# Patient Record
Sex: MALE | Race: WHITE | NOT HISPANIC OR LATINO | Employment: FULL TIME | URBAN - METROPOLITAN AREA
[De-identification: names, ages, dates, MRNs, and addresses within clinical notes are randomized per-mention and may not be internally consistent; named-entity substitution may affect disease eponyms.]

---

## 2017-04-12 ENCOUNTER — OFFICE VISIT (OUTPATIENT)
Dept: OPHTHALMOLOGY | Facility: CLINIC | Age: 72
End: 2017-04-12
Payer: MEDICARE

## 2017-04-12 DIAGNOSIS — E11.9 TYPE 2 DIABETES MELLITUS WITHOUT COMPLICATION, WITHOUT LONG-TERM CURRENT USE OF INSULIN: Primary | ICD-10-CM

## 2017-04-12 DIAGNOSIS — H04.123 DRY EYE SYNDROME, BILATERAL: ICD-10-CM

## 2017-04-12 DIAGNOSIS — H40.31X2 TRAUMATIC GLAUCOMA, RIGHT, MODERATE STAGE: ICD-10-CM

## 2017-04-12 PROCEDURE — 92083 EXTENDED VISUAL FIELD XM: CPT | Mod: S$GLB,,, | Performed by: OPHTHALMOLOGY

## 2017-04-12 PROCEDURE — 99999 PR PBB SHADOW E&M-EST. PATIENT-LVL I: CPT | Mod: PBBFAC,,, | Performed by: OPHTHALMOLOGY

## 2017-04-12 PROCEDURE — 92133 CPTRZD OPH DX IMG PST SGM ON: CPT | Mod: S$GLB,,, | Performed by: OPHTHALMOLOGY

## 2017-04-12 PROCEDURE — 92014 COMPRE OPH EXAM EST PT 1/>: CPT | Mod: S$GLB,,, | Performed by: OPHTHALMOLOGY

## 2017-04-12 NOTE — PROGRESS NOTES
HPI     3 month IOP check and HVF review.  Eyes tire easily.  His job requires   reading extensively.    Last visit 1/5/15  DM since 2000  Traumatic aphakia OD  Glaucoma OD noncompliant  tmax 37/22  Last VF 4/2011, sup altitudinal loss      Alphagan, latanoprost and cosopt all burned with photosensitivity    Ti molol bid od  Latanoprost qhs OU  95% compliance  O3FO bid  Systane ultra prn       Last edited by Ruma Harrell on 4/12/2017  9:27 AM.         Assessment /Plan     For exam results, see Encounter Report.      ICD-10-CM ICD-9-CM    1. Type 2 diabetes mellitus without complication, without long-term current use of insulin E11.9 250.00 Diabetes with no diabetic retinopathy on dilated exam.   Reviewed diabetic eye precautions including excellent blood sugar control, and importance of regular follow up.          2. Traumatic glaucoma, right, moderate stage H40.31X2 365.65 Lopez Visual Field - OU - Extended - Both Eyes     365.72 Posterior Segment OCT Optic Nerve- Both eyes Done today   Doing well - intraocular pressure is within acceptable range relative to target pressure with no evidence of progression.   Continue current treatment.  Reviewed importance of continued compliance with treatment and follow up.      3. Dry eye syndrome, bilateral H04.123 375.15 Appears stable- Follow        RETURN TO CLINIC 4 month IOP      Ti molol bid od  Latanoprost qhs OU  95% compliance  O3FO bid  Systane ultra prn

## 2017-04-12 NOTE — LETTER
[facility] - Ophthalmology  [address] [address]  Phone: [phone]  Fax: [phone]   April 12, 2017    Brandon Wharton MD  [address] [facility] [address]    Patient: João Singh   MR Number: [id number]   YOB: 1945   Date of Visit: 4/12/2017       Dear Dr. Wharton :    Thank you for referring João Singh to me for evaluation. Here is my assessment and plan of care:     /       For exam results, see Encounter Report.      ICD-10-CM ICD-9-CM    1. Type 2 diabetes mellitus without complication, without long-term current use of insulin E11.9 250.00 Diabetes with no diabetic retinopathy on dilated exam.   Reviewed diabetic eye precautions including excellent blood sugar control, and importance of regular follow up.          2. Traumatic glaucoma, right, moderate stage H40.31X2 365.65 Lopez Visual Field - OU - Extended - Both Eyes     365.72 Posterior Segment OCT Optic Nerve- Both eyes Done today   Doing well - intraocular pressure is within acceptable range relative to target pressure with no evidence of progression.   Continue current treatment.  Reviewed importance of continued compliance with treatment and follow up.      3. Dry eye syndrome, bilateral H04.123 375.15 Appears stable- Follow        RETURN TO CLINIC 4 month IOP      Ti molol bid od  Latanoprost qhs OU  95% compliance  O3FO bid  Systane ultra prn                  If you have questions, please do not hesitate to call me. I look forward to following Mr. João Singh along with you.    Sincerely,        Brandon Spencer MD       CC  No Recipients

## 2017-06-12 DIAGNOSIS — H40.31X0 TRAUMATIC GLAUCOMA, RIGHT, STAGE UNSPECIFIED: ICD-10-CM

## 2017-06-12 RX ORDER — LATANOPROST 50 UG/ML
SOLUTION/ DROPS OPHTHALMIC
Qty: 2.5 ML | Refills: 4 | Status: SHIPPED | OUTPATIENT
Start: 2017-06-12 | End: 2018-04-30 | Stop reason: SDUPTHER

## 2017-06-13 RX ORDER — TIMOLOL MALEATE 5 MG/ML
SOLUTION/ DROPS OPHTHALMIC
Qty: 5 ML | Refills: 5 | Status: SHIPPED | OUTPATIENT
Start: 2017-06-13 | End: 2018-03-06 | Stop reason: SDUPTHER

## 2017-08-30 ENCOUNTER — OFFICE VISIT (OUTPATIENT)
Dept: OPHTHALMOLOGY | Facility: CLINIC | Age: 72
End: 2017-08-30
Payer: MEDICARE

## 2017-08-30 DIAGNOSIS — H04.123 DRY EYE SYNDROME, BILATERAL: ICD-10-CM

## 2017-08-30 DIAGNOSIS — H40.31X2 TRAUMATIC GLAUCOMA, RIGHT, MODERATE STAGE: Primary | ICD-10-CM

## 2017-08-30 PROCEDURE — 92012 INTRM OPH EXAM EST PATIENT: CPT | Mod: S$GLB,,, | Performed by: OPHTHALMOLOGY

## 2017-08-30 PROCEDURE — 99999 PR PBB SHADOW E&M-EST. PATIENT-LVL II: CPT | Mod: PBBFAC,,, | Performed by: OPHTHALMOLOGY

## 2017-08-30 NOTE — PROGRESS NOTES
HPI     4 month IOP check.      Last visit 1/5/15  DM since 2000  Traumatic aphakia OD  Glaucoma OD noncompliant  tmax 37/22  Last VF 4/2011, sup altitudinal loss      Alphagan, latanoprost and cosopt all burned with photosensitivity    Timolol bid od  Latanoprost qhs OU  98% compliance  O3FO bid  Systane ultra prn    Last edited by Ruma Harrell on 8/30/2017  8:30 AM. (History)            Assessment /Plan     For exam results, see Encounter Report.      ICD-10-CM ICD-9-CM    1. Traumatic glaucoma, right, moderate stage H40.31X2 365.65 Doing well - intraocular pressure is within acceptable range relative to target pressure with no evidence of progression.   Continue current treatment.  Reviewed importance of continued compliance with treatment and follow up.        365.72    2. Dry eye syndrome, bilateral H04.123 375.15 Cont current regimen       RETURN TO CLINIC 4 months with IOP check

## 2018-03-06 DIAGNOSIS — H40.31X0 TRAUMATIC GLAUCOMA, RIGHT, STAGE UNSPECIFIED: ICD-10-CM

## 2018-03-06 RX ORDER — TIMOLOL MALEATE 5 MG/ML
SOLUTION/ DROPS OPHTHALMIC
Qty: 5 ML | Refills: 4 | Status: SHIPPED | OUTPATIENT
Start: 2018-03-06 | End: 2018-10-27 | Stop reason: SDUPTHER

## 2018-04-30 DIAGNOSIS — H40.31X0 TRAUMATIC GLAUCOMA, RIGHT, STAGE UNSPECIFIED: ICD-10-CM

## 2018-04-30 RX ORDER — LATANOPROST 50 UG/ML
1 SOLUTION/ DROPS OPHTHALMIC NIGHTLY
Qty: 2.5 ML | Refills: 4 | Status: SHIPPED | OUTPATIENT
Start: 2018-04-30 | End: 2018-05-11 | Stop reason: SDUPTHER

## 2018-05-11 DIAGNOSIS — H40.31X0 TRAUMATIC GLAUCOMA, RIGHT, STAGE UNSPECIFIED: ICD-10-CM

## 2018-05-11 RX ORDER — LATANOPROST 50 UG/ML
1 SOLUTION/ DROPS OPHTHALMIC NIGHTLY
Qty: 2.5 ML | Refills: 11 | Status: SHIPPED | OUTPATIENT
Start: 2018-05-11 | End: 2019-04-15 | Stop reason: SDUPTHER

## 2018-10-23 ENCOUNTER — TELEPHONE (OUTPATIENT)
Dept: OPHTHALMOLOGY | Facility: CLINIC | Age: 73
End: 2018-10-23

## 2018-10-27 DIAGNOSIS — H40.31X0 TRAUMATIC GLAUCOMA, RIGHT, STAGE UNSPECIFIED: ICD-10-CM

## 2018-10-29 RX ORDER — TIMOLOL MALEATE 5 MG/ML
SOLUTION/ DROPS OPHTHALMIC
Qty: 5 ML | Refills: 0 | Status: SHIPPED | OUTPATIENT
Start: 2018-10-29 | End: 2018-12-03 | Stop reason: SDUPTHER

## 2018-11-07 ENCOUNTER — OFFICE VISIT (OUTPATIENT)
Dept: OPHTHALMOLOGY | Facility: CLINIC | Age: 73
End: 2018-11-07
Payer: MEDICARE

## 2018-11-07 DIAGNOSIS — H52.7 REFRACTION DISORDER: ICD-10-CM

## 2018-11-07 DIAGNOSIS — H18.529 MAP-DOT-FINGERPRINT CORNEAL DYSTROPHY: ICD-10-CM

## 2018-11-07 DIAGNOSIS — E11.9 TYPE 2 DIABETES MELLITUS WITHOUT COMPLICATION, WITHOUT LONG-TERM CURRENT USE OF INSULIN: ICD-10-CM

## 2018-11-07 DIAGNOSIS — H25.12 NUCLEAR SENILE CATARACT OF LEFT EYE: ICD-10-CM

## 2018-11-07 DIAGNOSIS — H04.123 DRY EYE SYNDROME, BILATERAL: ICD-10-CM

## 2018-11-07 DIAGNOSIS — H40.31X2 TRAUMATIC GLAUCOMA, RIGHT, MODERATE STAGE: Primary | ICD-10-CM

## 2018-11-07 PROCEDURE — 92014 COMPRE OPH EXAM EST PT 1/>: CPT | Mod: S$GLB,,, | Performed by: OPHTHALMOLOGY

## 2018-11-07 PROCEDURE — 99999 PR PBB SHADOW E&M-EST. PATIENT-LVL II: CPT | Mod: PBBFAC,,, | Performed by: OPHTHALMOLOGY

## 2018-11-07 PROCEDURE — 92015 DETERMINE REFRACTIVE STATE: CPT | Mod: S$GLB,,, | Performed by: OPHTHALMOLOGY

## 2018-11-07 RX ORDER — INSULIN GLARGINE 100 [IU]/ML
INJECTION, SOLUTION SUBCUTANEOUS
COMMUNITY
Start: 2018-10-24 | End: 2019-01-22

## 2018-11-07 NOTE — PROGRESS NOTES
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 The patient   states his eyes are doing okay but he has noticed a decrease in vision.   The patient denies any ocular pain.   100% drop compliance      Last visit 1/5/15  1. DM since 2000  2. Traumatic aphakia OD  3. Glaucoma OD noncompliant  tmax 37/22  Last VF 4/2011, sup altitudinal loss      Alphagan, latanoprost and cosopt all burned with photosensitivity    Timolol bid od  Xalatan qhs OD  O3FO bid  Systane ultra prn          Last edited by Reena Sims on 11/7/2018  2:55 PM. (History)            Assessment /Plan     For exam results, see Encounter Report.      ICD-10-CM ICD-9-CM    1. Traumatic glaucoma, right, moderate stage H40.31X2 365.65 Doing well - intraocular pressure is within acceptable range relative to target pressure with no evidence of progression.   Continue current treatment.  Reviewed importance of continued compliance with treatment and follow up.        365.72    2. Type 2 diabetes mellitus without complication, without long-term current use of insulin E11.9 250.00 Diabetes with no diabetic retinopathy on dilated exam.   Reviewed diabetic eye precautions including excellent blood sugar control, and importance of regular follow up.          3. Map-dot-fingerprint corneal dystrophy H18.59 371.52 Both eyes. Will follow    4. Dry eye syndrome, bilateral H04.123 375.15 Well, continue current treatment.    5. 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 Balance OD    6. Nuclear sclerosis, left eye- will follow     Timolol bid od  Xalatan qhs OD  O3FO bid  Systane ultra prn       Return to clinic 4 months with IOP check and GOCT

## 2018-12-03 DIAGNOSIS — H40.31X0 TRAUMATIC GLAUCOMA, RIGHT, STAGE UNSPECIFIED: ICD-10-CM

## 2018-12-04 RX ORDER — TIMOLOL MALEATE 5 MG/ML
SOLUTION/ DROPS OPHTHALMIC
Qty: 5 ML | Refills: 5 | Status: SHIPPED | OUTPATIENT
Start: 2018-12-04 | End: 2019-07-09 | Stop reason: SDUPTHER

## 2018-12-06 ENCOUNTER — TELEPHONE (OUTPATIENT)
Dept: OPHTHALMOLOGY | Facility: CLINIC | Age: 73
End: 2018-12-06

## 2018-12-06 NOTE — TELEPHONE ENCOUNTER
----- Message from Sugey Sales sent at 12/6/2018  2:10 PM CST -----  Contact: eleonora Harp needs call back to get patient ad power on patient rx [phone]

## 2019-04-15 DIAGNOSIS — H40.31X0 TRAUMATIC GLAUCOMA, RIGHT, STAGE UNSPECIFIED: ICD-10-CM

## 2019-04-15 RX ORDER — LATANOPROST 50 UG/ML
SOLUTION/ DROPS OPHTHALMIC
Qty: 2.5 ML | Refills: 10 | Status: SHIPPED | OUTPATIENT
Start: 2019-04-15 | End: 2019-10-12 | Stop reason: SDUPTHER

## 2019-04-19 NOTE — TELEPHONE ENCOUNTER
----- Message from Mi Muellerite sent at 10/23/2018  3:40 PM CDT -----  Contact: Pt  Pt called and stated he needs to schedule with Dr. Spencer. He can be reached at [phone].    Thanks,  TF     
I left a message for the patient to call me. I was calling the patient to see if I could schedule him an appointment to see Dr. Spencer.  
Ambulatory

## 2019-07-09 DIAGNOSIS — H40.31X0 TRAUMATIC GLAUCOMA, RIGHT, STAGE UNSPECIFIED: ICD-10-CM

## 2019-07-09 RX ORDER — TIMOLOL MALEATE 5 MG/ML
SOLUTION/ DROPS OPHTHALMIC
Qty: 5 ML | Refills: 4 | Status: SHIPPED | OUTPATIENT
Start: 2019-07-09 | End: 2019-12-24

## 2019-10-12 DIAGNOSIS — H40.31X0 TRAUMATIC GLAUCOMA, RIGHT, STAGE UNSPECIFIED: ICD-10-CM

## 2019-10-14 RX ORDER — LATANOPROST 50 UG/ML
SOLUTION/ DROPS OPHTHALMIC
Qty: 2.5 ML | Refills: 9 | Status: SHIPPED | OUTPATIENT
Start: 2019-10-14 | End: 2020-07-10 | Stop reason: SDUPTHER

## 2019-10-29 ENCOUNTER — OFFICE VISIT (OUTPATIENT)
Dept: OPHTHALMOLOGY | Facility: CLINIC | Age: 74
End: 2019-10-29
Payer: MEDICARE

## 2019-10-29 DIAGNOSIS — H40.31X2 TRAUMATIC GLAUCOMA, RIGHT, MODERATE STAGE: Primary | ICD-10-CM

## 2019-10-29 DIAGNOSIS — H18.529 MAP-DOT-FINGERPRINT CORNEAL DYSTROPHY: ICD-10-CM

## 2019-10-29 DIAGNOSIS — H25.12 NUCLEAR SENILE CATARACT OF LEFT EYE: ICD-10-CM

## 2019-10-29 DIAGNOSIS — E11.9 TYPE 2 DIABETES MELLITUS WITHOUT COMPLICATION, WITHOUT LONG-TERM CURRENT USE OF INSULIN: ICD-10-CM

## 2019-10-29 DIAGNOSIS — H04.123 DRY EYE SYNDROME, BILATERAL: ICD-10-CM

## 2019-10-29 PROCEDURE — 99999 PR PBB SHADOW E&M-EST. PATIENT-LVL II: CPT | Mod: PBBFAC,,, | Performed by: OPHTHALMOLOGY

## 2019-10-29 PROCEDURE — 92133 POSTERIOR SEGMENT OCT OPTIC NERVE(OCULAR COHERENCE TOMOGRAPHY) - OU - BOTH EYES: ICD-10-PCS | Mod: S$GLB,,, | Performed by: OPHTHALMOLOGY

## 2019-10-29 PROCEDURE — 99999 PR PBB SHADOW E&M-EST. PATIENT-LVL II: ICD-10-PCS | Mod: PBBFAC,,, | Performed by: OPHTHALMOLOGY

## 2019-10-29 PROCEDURE — 92012 INTRM OPH EXAM EST PATIENT: CPT | Mod: S$GLB,,, | Performed by: OPHTHALMOLOGY

## 2019-10-29 PROCEDURE — 92133 CPTRZD OPH DX IMG PST SGM ON: CPT | Mod: S$GLB,,, | Performed by: OPHTHALMOLOGY

## 2019-10-29 PROCEDURE — 92012 PR EYE EXAM, EST PATIENT,INTERMED: ICD-10-PCS | Mod: S$GLB,,, | Performed by: OPHTHALMOLOGY

## 2019-10-29 RX ORDER — BLOOD SUGAR DIAGNOSTIC
STRIP MISCELLANEOUS
COMMUNITY
Start: 2019-09-16

## 2019-10-29 RX ORDER — PEN NEEDLE, DIABETIC 30 GX3/16"
NEEDLE, DISPOSABLE MISCELLANEOUS
COMMUNITY
Start: 2019-04-23

## 2019-10-29 RX ORDER — LANCETS
EACH MISCELLANEOUS
COMMUNITY
Start: 2019-07-30

## 2019-10-29 RX ORDER — INSULIN GLARGINE 100 [IU]/ML
INJECTION, SOLUTION SUBCUTANEOUS
COMMUNITY
Start: 2019-09-16

## 2019-10-29 RX ORDER — LANCETS
EACH MISCELLANEOUS
COMMUNITY
Start: 2019-10-29

## 2019-10-29 NOTE — PROGRESS NOTES
HPI     Glaucoma      Additional comments: IOP Check with GOCT              Comments     Lost to follow up  Patient is here for IOP check and GOCT, states he is using both drops as   directed.      1. DM since 2000  2. Traumatic aphakia OD  3. Glaucoma OD noncompliant  tmax 37/22  Last VF 4/2011, sup altitudinal loss      Alphagan, latanoprost and cosopt all burned with photosensitivity    Between March 2019 to October 2019 patient has had steroid injections in   his back and knee  DX with Parkinson's in 2016    Timolol bid od(using bid ou)  Xalatan qhs OD(using bid ou)  O3FO bid  Systane ultra prn          Last edited by Venita Mejia, Patient Care Assistant on 10/29/2019  3:45   PM. (History)            Assessment /Plan     For exam results, see Encounter Report.      ICD-10-CM ICD-9-CM    1. Traumatic glaucoma, right, moderate stage H40.31X2 365.65 Posterior Segment OCT Optic Nerve- Both eyes    IOP higher today OD, but thick CCT. Will follow      365.72    2. Type 2 diabetes mellitus without complication, without long-term current use of insulin E11.9 250.00 Dilate next visit    3. Map-dot-fingerprint corneal dystrophy H18.59 371.52 Stable, follow    4. Dry eye syndrome, bilateral H04.123 375.15 Well    5. Nuclear senile cataract of left eye H25.12 366.16   You were found to have an early cataract in your eye(s) today, however the cataract is not affecting your activities of daily living, such as reading and driving.You do not need  surgery at this time. We will recheck your cataract at your next visit. You are welcome to call for an earlier appointment if your vision gets worse.        Timolol bid OU  Xalatan qhs OU  O3FO bid  Systane ultra prn   Return to clinic 4 months with dilation and SDP

## 2019-12-21 DIAGNOSIS — H40.31X0 TRAUMATIC GLAUCOMA, RIGHT, STAGE UNSPECIFIED: ICD-10-CM

## 2019-12-24 RX ORDER — TIMOLOL MALEATE 5 MG/ML
SOLUTION/ DROPS OPHTHALMIC
Qty: 5 ML | Refills: 0 | Status: SHIPPED | OUTPATIENT
Start: 2019-12-24 | End: 2020-01-28

## 2020-01-28 DIAGNOSIS — H40.31X0 TRAUMATIC GLAUCOMA, RIGHT, STAGE UNSPECIFIED: ICD-10-CM

## 2020-01-28 RX ORDER — TIMOLOL MALEATE 5 MG/ML
SOLUTION/ DROPS OPHTHALMIC
Qty: 5 ML | Refills: 12 | Status: SHIPPED | OUTPATIENT
Start: 2020-01-28 | End: 2020-07-10 | Stop reason: SDUPTHER

## 2020-02-13 ENCOUNTER — PATIENT MESSAGE (OUTPATIENT)
Dept: OPHTHALMOLOGY | Facility: CLINIC | Age: 75
End: 2020-02-13

## 2020-02-20 ENCOUNTER — TELEPHONE (OUTPATIENT)
Dept: OPHTHALMOLOGY | Facility: CLINIC | Age: 75
End: 2020-02-20

## 2020-02-20 NOTE — TELEPHONE ENCOUNTER
attempted to call pt. his 3/13 @ 2:15 appt. w/ SANDRA needs to be R/S to another day due to [employer] meeting. Please R/S if the pt. calls back

## 2020-07-10 DIAGNOSIS — H40.31X0 TRAUMATIC GLAUCOMA, RIGHT, STAGE UNSPECIFIED: ICD-10-CM

## 2020-07-10 RX ORDER — LATANOPROST 50 UG/ML
1 SOLUTION/ DROPS OPHTHALMIC NIGHTLY
Qty: 2.5 ML | Refills: 12 | Status: SHIPPED | OUTPATIENT
Start: 2020-07-10 | End: 2020-09-10 | Stop reason: SDUPTHER

## 2020-07-10 RX ORDER — TIMOLOL MALEATE 5 MG/ML
1 SOLUTION/ DROPS OPHTHALMIC 2 TIMES DAILY
Qty: 5 ML | Refills: 3 | Status: SHIPPED | OUTPATIENT
Start: 2020-07-10 | End: 2020-07-30 | Stop reason: SDUPTHER

## 2020-07-30 ENCOUNTER — OFFICE VISIT (OUTPATIENT)
Dept: OPHTHALMOLOGY | Facility: CLINIC | Age: 75
End: 2020-07-30
Payer: MEDICARE

## 2020-07-30 DIAGNOSIS — H04.123 DRY EYE SYNDROME, BILATERAL: Primary | ICD-10-CM

## 2020-07-30 DIAGNOSIS — H40.31X2 TRAUMATIC GLAUCOMA, RIGHT, MODERATE STAGE: ICD-10-CM

## 2020-07-30 DIAGNOSIS — H25.12 NUCLEAR SENILE CATARACT OF LEFT EYE: ICD-10-CM

## 2020-07-30 DIAGNOSIS — E11.9 TYPE 2 DIABETES MELLITUS WITHOUT COMPLICATION, WITHOUT LONG-TERM CURRENT USE OF INSULIN: ICD-10-CM

## 2020-07-30 DIAGNOSIS — H18.529 MAP-DOT-FINGERPRINT CORNEAL DYSTROPHY: ICD-10-CM

## 2020-07-30 DIAGNOSIS — H40.31X0 TRAUMATIC GLAUCOMA, RIGHT, STAGE UNSPECIFIED: ICD-10-CM

## 2020-07-30 PROCEDURE — 92014 PR EYE EXAM, EST PATIENT,COMPREHESV: ICD-10-PCS | Mod: S$GLB,,, | Performed by: OPHTHALMOLOGY

## 2020-07-30 PROCEDURE — 99999 PR PBB SHADOW E&M-EST. PATIENT-LVL III: ICD-10-PCS | Mod: PBBFAC,,, | Performed by: OPHTHALMOLOGY

## 2020-07-30 PROCEDURE — 99999 PR PBB SHADOW E&M-EST. PATIENT-LVL III: CPT | Mod: PBBFAC,,, | Performed by: OPHTHALMOLOGY

## 2020-07-30 PROCEDURE — 92014 COMPRE OPH EXAM EST PT 1/>: CPT | Mod: S$GLB,,, | Performed by: OPHTHALMOLOGY

## 2020-07-30 RX ORDER — TIMOLOL MALEATE 5 MG/ML
1 SOLUTION/ DROPS OPHTHALMIC 2 TIMES DAILY
Qty: 5 ML | Refills: 3 | Status: SHIPPED | OUTPATIENT
Start: 2020-07-30 | End: 2020-09-10 | Stop reason: SDUPTHER

## 2020-07-30 RX ORDER — LATANOPROST 50 UG/ML
1 SOLUTION/ DROPS OPHTHALMIC DAILY
Qty: 2.5 ML | Refills: 1 | Status: SHIPPED | OUTPATIENT
Start: 2020-07-30 | End: 2021-03-23 | Stop reason: SDUPTHER

## 2020-07-30 NOTE — PROGRESS NOTES
HPI     Glaucoma     Comments: lost to F/U COAG              Comments     The patient states his eyes are doing fine and he denies any ocular pain   at this time.    1. DM since 2000  2. Traumatic aphakia OD  3. Glaucoma OD noncompliant  tmax 37/22  Last VF 4/2011, sup altitudinal loss  4. Parkinsons      Alphagan, latanoprost and cosopt all burned with photosensitivity    Between March 2019 to October 2019 patient has had steroid injections in   his back and knee.  DX with Parkinson's in 2016    Timolol bid od  Xalatan qhs OD  O3FO bid  Systane ultra prn          Last edited by Reena Sims on 7/30/2020  3:08 PM. (History)            Assessment /Plan     For exam results, see Encounter Report.      ICD-10-CM ICD-9-CM    1. Dry eye syndrome, bilateral  H04.123 375.15 Dryness increased on exam today  Will add Systane Gel QPM     2. Traumatic glaucoma, right, moderate stage  H40.31X2 365.65 IOP not within acceptable range relative to target IOP with risk of irreversible visual loss. Additional treatment required.  Discussed options, risks, and benefits of additional medication, SLT laser, or incisional glaucoma surgery.     recommend observing and consider SLT OD in 1 month    Patient chooses above    Reviewed importance of continued compliance with treatment and follow up.        365.72    3. Type 2 diabetes mellitus without complication, without long-term current use of insulin  E11.9 250.00 Diabetes with no diabetic retinopathy on dilated exam.   Reviewed diabetic eye precautions including excellent blood sugar control, and importance of regular follow up.          4. Map-dot-fingerprint corneal dystrophy  H18.59 371.52 FOLLOW   5. Nuclear senile cataract of left eye  H25.12 366.16 FOLLOW     Systane Gel QPM OU      Return to clinic 1M IOP check and consider SLT OD

## 2020-09-10 DIAGNOSIS — H40.31X2 TRAUMATIC GLAUCOMA, RIGHT, MODERATE STAGE: ICD-10-CM

## 2020-09-10 DIAGNOSIS — H40.31X0 TRAUMATIC GLAUCOMA, RIGHT, STAGE UNSPECIFIED: ICD-10-CM

## 2020-09-10 RX ORDER — LATANOPROST 50 UG/ML
1 SOLUTION/ DROPS OPHTHALMIC NIGHTLY
Qty: 2.5 ML | Refills: 6 | Status: SHIPPED | OUTPATIENT
Start: 2020-09-10 | End: 2021-05-12 | Stop reason: SDUPTHER

## 2020-09-10 RX ORDER — TIMOLOL MALEATE 5 MG/ML
1 SOLUTION/ DROPS OPHTHALMIC 2 TIMES DAILY
Qty: 5 ML | Refills: 6 | Status: SHIPPED | OUTPATIENT
Start: 2020-09-10 | End: 2021-03-23 | Stop reason: SDUPTHER

## 2020-09-15 ENCOUNTER — TELEPHONE (OUTPATIENT)
Dept: OPHTHALMOLOGY | Facility: CLINIC | Age: 75
End: 2020-09-15

## 2020-09-15 NOTE — TELEPHONE ENCOUNTER
Spoke with patient and he will try and keep his appointment tomorrow with Dr. Spencer but is no sure if he can.

## 2020-10-27 ENCOUNTER — OFFICE VISIT (OUTPATIENT)
Dept: OPHTHALMOLOGY | Facility: CLINIC | Age: 75
End: 2020-10-27
Payer: MEDICARE

## 2020-10-27 DIAGNOSIS — H40.31X2 TRAUMATIC GLAUCOMA, RIGHT, MODERATE STAGE: Primary | ICD-10-CM

## 2020-10-27 DIAGNOSIS — H04.123 DRY EYE SYNDROME, BILATERAL: ICD-10-CM

## 2020-10-27 DIAGNOSIS — H18.529 MAP-DOT-FINGERPRINT CORNEAL DYSTROPHY: ICD-10-CM

## 2020-10-27 PROCEDURE — 99024 PR POST-OP FOLLOW-UP VISIT: ICD-10-PCS | Mod: S$GLB,,, | Performed by: OPHTHALMOLOGY

## 2020-10-27 PROCEDURE — 99999 PR PBB SHADOW E&M-EST. PATIENT-LVL III: ICD-10-PCS | Mod: PBBFAC,,, | Performed by: OPHTHALMOLOGY

## 2020-10-27 PROCEDURE — 99999 PR PBB SHADOW E&M-EST. PATIENT-LVL III: CPT | Mod: PBBFAC,,, | Performed by: OPHTHALMOLOGY

## 2020-10-27 PROCEDURE — 99024 POSTOP FOLLOW-UP VISIT: CPT | Mod: S$GLB,,, | Performed by: OPHTHALMOLOGY

## 2020-10-27 NOTE — PROGRESS NOTES
HPI     Glaucoma     Comments: 1 mo IOP check              Comments     Pt in today for IOP check. No complaints of pain or discomfort. Pt   compliant with drops.  Pt states he feels his VA is weaker.  1. DM since 2000  2. Traumatic aphakia OD  3. Glaucoma OD noncompliant  tmax 37/22  Last VF 4/2011, sup altitudinal loss  4. Parkinsons      Alphagan, latanoprost and cosopt all burned with photosensitivity    Between March 2019 to October 2019 patient has had steroid injections in   his back and knee.  DX with Parkinson's in 2016    Timolol bid od  Xalatan qhs OU  O3FO bid  Systane ultra prn  Systane gel qpm OU            Last edited by Ariella Baker MA on 10/27/2020  1:24 PM. (History)            Assessment /Plan     For exam results, see Encounter Report.      ICD-10-CM ICD-9-CM    1. Traumatic glaucoma, right, moderate stage  H40.31X2 365.65 Doing well - intraocular pressure is within acceptable range relative to target pressure with no evidence of progression.   Continue current treatment.  Reviewed importance of continued compliance with treatment and follow      365.72    2. Dry eye syndrome, bilateral  H04.123 375.15 Follow    3. Map-dot-fingerprint corneal dystrophy  H18.599 371.52        RETURN TO CLINIC 4 month IOP     Timolol bid od  Xalatan qhs OU  O3FO bid  Systane ultra prn  Systane gel qpm OU

## 2021-03-19 ENCOUNTER — PATIENT MESSAGE (OUTPATIENT)
Dept: OPHTHALMOLOGY | Facility: CLINIC | Age: 76
End: 2021-03-19

## 2021-03-23 DIAGNOSIS — H40.31X0 TRAUMATIC GLAUCOMA, RIGHT, STAGE UNSPECIFIED: ICD-10-CM

## 2021-03-23 DIAGNOSIS — H40.31X2 TRAUMATIC GLAUCOMA, RIGHT, MODERATE STAGE: ICD-10-CM

## 2021-03-23 RX ORDER — LATANOPROST 50 UG/ML
1 SOLUTION/ DROPS OPHTHALMIC DAILY
Qty: 2.5 ML | Refills: 1 | Status: SHIPPED | OUTPATIENT
Start: 2021-03-23 | End: 2021-10-18 | Stop reason: SDUPTHER

## 2021-03-23 RX ORDER — TIMOLOL MALEATE 5 MG/ML
1 SOLUTION/ DROPS OPHTHALMIC 2 TIMES DAILY
Qty: 5 ML | Refills: 6 | Status: SHIPPED | OUTPATIENT
Start: 2021-03-23 | End: 2021-07-25 | Stop reason: SDUPTHER

## 2021-05-06 ENCOUNTER — PATIENT MESSAGE (OUTPATIENT)
Dept: OPHTHALMOLOGY | Facility: CLINIC | Age: 76
End: 2021-05-06

## 2021-05-07 ENCOUNTER — PATIENT MESSAGE (OUTPATIENT)
Dept: OPHTHALMOLOGY | Facility: CLINIC | Age: 76
End: 2021-05-07

## 2021-05-10 ENCOUNTER — PATIENT MESSAGE (OUTPATIENT)
Dept: OPHTHALMOLOGY | Facility: CLINIC | Age: 76
End: 2021-05-10

## 2021-05-12 ENCOUNTER — TELEPHONE (OUTPATIENT)
Dept: OPHTHALMOLOGY | Facility: CLINIC | Age: 76
End: 2021-05-12

## 2021-05-12 DIAGNOSIS — H40.31X0 TRAUMATIC GLAUCOMA, RIGHT, STAGE UNSPECIFIED: ICD-10-CM

## 2021-05-12 RX ORDER — LATANOPROST 50 UG/ML
1 SOLUTION/ DROPS OPHTHALMIC NIGHTLY
Qty: 2.5 ML | Refills: 6 | Status: SHIPPED | OUTPATIENT
Start: 2021-05-12 | End: 2021-07-25 | Stop reason: SDUPTHER

## 2021-07-25 DIAGNOSIS — H40.31X0 TRAUMATIC GLAUCOMA, RIGHT, STAGE UNSPECIFIED: ICD-10-CM

## 2021-07-26 RX ORDER — LATANOPROST 50 UG/ML
1 SOLUTION/ DROPS OPHTHALMIC NIGHTLY
Qty: 2.5 ML | Refills: 6 | Status: SHIPPED | OUTPATIENT
Start: 2021-07-26 | End: 2022-08-15 | Stop reason: SDUPTHER

## 2021-07-27 ENCOUNTER — TELEPHONE (OUTPATIENT)
Dept: OPHTHALMOLOGY | Facility: CLINIC | Age: 76
End: 2021-07-27

## 2021-08-24 ENCOUNTER — PATIENT MESSAGE (OUTPATIENT)
Dept: OPHTHALMOLOGY | Facility: CLINIC | Age: 76
End: 2021-08-24

## 2021-10-18 ENCOUNTER — PATIENT MESSAGE (OUTPATIENT)
Dept: OPHTHALMOLOGY | Facility: CLINIC | Age: 76
End: 2021-10-18
Payer: MEDICARE

## 2021-10-18 DIAGNOSIS — H40.31X2 TRAUMATIC GLAUCOMA, RIGHT, MODERATE STAGE: ICD-10-CM

## 2021-10-19 RX ORDER — TIMOLOL MALEATE 5 MG/ML
1 SOLUTION/ DROPS OPHTHALMIC 2 TIMES DAILY
Qty: 5 ML | Refills: 6 | Status: SHIPPED | OUTPATIENT
Start: 2021-10-19 | End: 2024-01-31 | Stop reason: SDUPTHER

## 2021-10-19 RX ORDER — LATANOPROST 50 UG/ML
1 SOLUTION/ DROPS OPHTHALMIC DAILY
Qty: 2.5 ML | Refills: 6 | Status: SHIPPED | OUTPATIENT
Start: 2021-10-19 | End: 2022-10-19

## 2021-12-01 ENCOUNTER — PATIENT MESSAGE (OUTPATIENT)
Dept: OPHTHALMOLOGY | Facility: CLINIC | Age: 76
End: 2021-12-01
Payer: MEDICARE

## 2022-02-15 ENCOUNTER — OFFICE VISIT (OUTPATIENT)
Dept: OPHTHALMOLOGY | Facility: CLINIC | Age: 77
End: 2022-02-15
Payer: MEDICARE

## 2022-02-15 DIAGNOSIS — H18.523 CORNEAL EPITHELIAL BASEMENT MEMBRANE DYSTROPHY OF BOTH EYES: ICD-10-CM

## 2022-02-15 DIAGNOSIS — E11.9 TYPE 2 DIABETES MELLITUS WITHOUT COMPLICATION, WITHOUT LONG-TERM CURRENT USE OF INSULIN: ICD-10-CM

## 2022-02-15 DIAGNOSIS — H04.123 DRY EYE SYNDROME, BILATERAL: ICD-10-CM

## 2022-02-15 DIAGNOSIS — H40.31X2 TRAUMATIC GLAUCOMA, RIGHT, MODERATE STAGE: Primary | ICD-10-CM

## 2022-02-15 PROCEDURE — 1159F PR MEDICATION LIST DOCUMENTED IN MEDICAL RECORD: ICD-10-PCS | Mod: CPTII,S$GLB,, | Performed by: OPHTHALMOLOGY

## 2022-02-15 PROCEDURE — 99214 OFFICE O/P EST MOD 30 MIN: CPT | Mod: S$GLB,,, | Performed by: OPHTHALMOLOGY

## 2022-02-15 PROCEDURE — 99214 PR OFFICE/OUTPT VISIT, EST, LEVL IV, 30-39 MIN: ICD-10-PCS | Mod: S$GLB,,, | Performed by: OPHTHALMOLOGY

## 2022-02-15 PROCEDURE — 92083 EXTENDED VISUAL FIELD XM: CPT | Mod: S$GLB,,, | Performed by: OPHTHALMOLOGY

## 2022-02-15 PROCEDURE — 1159F MED LIST DOCD IN RCRD: CPT | Mod: CPTII,S$GLB,, | Performed by: OPHTHALMOLOGY

## 2022-02-15 PROCEDURE — 99999 PR PBB SHADOW E&M-EST. PATIENT-LVL IV: ICD-10-PCS | Mod: PBBFAC,,, | Performed by: OPHTHALMOLOGY

## 2022-02-15 PROCEDURE — 1160F RVW MEDS BY RX/DR IN RCRD: CPT | Mod: CPTII,S$GLB,, | Performed by: OPHTHALMOLOGY

## 2022-02-15 PROCEDURE — 92133 CPTRZD OPH DX IMG PST SGM ON: CPT | Mod: S$GLB,,, | Performed by: OPHTHALMOLOGY

## 2022-02-15 PROCEDURE — 99999 PR PBB SHADOW E&M-EST. PATIENT-LVL IV: CPT | Mod: PBBFAC,,, | Performed by: OPHTHALMOLOGY

## 2022-02-15 PROCEDURE — 4010F ACE/ARB THERAPY RXD/TAKEN: CPT | Mod: CPTII,S$GLB,, | Performed by: OPHTHALMOLOGY

## 2022-02-15 PROCEDURE — 92133 POSTERIOR SEGMENT OCT OPTIC NERVE(OCULAR COHERENCE TOMOGRAPHY) - OU - BOTH EYES: ICD-10-PCS | Mod: S$GLB,,, | Performed by: OPHTHALMOLOGY

## 2022-02-15 PROCEDURE — 4010F PR ACE/ARB THEARPY RXD/TAKEN: ICD-10-PCS | Mod: CPTII,S$GLB,, | Performed by: OPHTHALMOLOGY

## 2022-02-15 PROCEDURE — 92083 HUMPHREY VISUAL FIELD - OU - BOTH EYES: ICD-10-PCS | Mod: S$GLB,,, | Performed by: OPHTHALMOLOGY

## 2022-02-15 PROCEDURE — 1160F PR REVIEW ALL MEDS BY PRESCRIBER/CLIN PHARMACIST DOCUMENTED: ICD-10-PCS | Mod: CPTII,S$GLB,, | Performed by: OPHTHALMOLOGY

## 2022-02-15 RX ORDER — DORZOLAMIDE HYDROCHLORIDE AND TIMOLOL MALEATE 20; 5 MG/ML; MG/ML
1 SOLUTION/ DROPS OPHTHALMIC 2 TIMES DAILY
Qty: 10 ML | Refills: 6 | Status: SHIPPED | OUTPATIENT
Start: 2022-02-15 | End: 2023-02-15

## 2022-02-15 RX ORDER — MONTELUKAST SODIUM 10 MG/1
1 TABLET ORAL DAILY
COMMUNITY
Start: 2022-01-31

## 2022-02-15 NOTE — PROGRESS NOTES
HPI     Glaucoma     Comments: 4M iop              Comments     Patient states that OU are a little light sensitive since last visit -   seem to make OU a little more tired by end of the day - Using and   tolerating Stoney OD/ Xalatan OU  - using art tears OU BID - denies pain/   discomfort oU        1. DM since 2000  2. Traumatic aphakia OD  3. Glaucoma OD noncompliant  tmax 37/22  Last VF 4/2011, sup altitudinal loss  4. Parkinsons      Alphagan, latanoprost and cosopt all burned with photosensitivity    Between March 2019 to October 2019 patient has had steroid injections in   his back and knee.  DX with Parkinson's in 2016    Timolol bid od  Xalatan qhs OU  O3FO bid  Systane ultra prn  Systane gel qpm OU            Last edited by Debby Cobb MA on 2/15/2022  4:00 PM. (History)            Assessment /Plan     For exam results, see Encounter Report.      ICD-10-CM ICD-9-CM    1. Traumatic glaucoma, right, moderate stage  H40.31X2 365.65 Lopez Visual Field - OU - Extended - Both Eyes     365.72 Posterior Segment OCT Optic Nerve- Both eyes    IOP OD not within acceptable range relative to target IOP with risk of irreversible visual loss. Additional treatment required.  Discussed options, risks, and benefits of additional medication, SLT laser, or incisional glaucoma surgery.     Recommend Cosopt BID OD, could consider SLT if IOP not lower     Patient chooses the above.    Reviewed importance of continued compliance with treatment and follow up.    2. 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 Type 2 diabetes mellitus without complication, without long-term current use of insulin  E11.9 250.00 No dilation today, though stable at previous dilation. Monitor.   4. Dry eye syndrome, bilateral  H04.123 375.15 Findings and symptoms consistent with moderate dry eyes.       Recommend:  Omega 3 fish oils of [employer] Ultimate Omega 3-4 capsules per  day  Systane Ultra or Refresh Optive Pepito 3: 4 times per day.  Genteal Gel or Systane gel in a tube at bedtime       Return to clinic 1 month for dilation  D/c Timolol, begin Cosopt BID OD        Cosopt BID OD  Xalatan qhs OU  O3FO bid  Systane ultra prn  Systane gel qpm OU

## 2022-03-15 ENCOUNTER — OFFICE VISIT (OUTPATIENT)
Dept: OPHTHALMOLOGY | Facility: CLINIC | Age: 77
End: 2022-03-15
Payer: MEDICARE

## 2022-03-15 DIAGNOSIS — H40.31X2 TRAUMATIC GLAUCOMA, RIGHT, MODERATE STAGE: Primary | ICD-10-CM

## 2022-03-15 DIAGNOSIS — H18.523 CORNEAL EPITHELIAL BASEMENT MEMBRANE DYSTROPHY OF BOTH EYES: ICD-10-CM

## 2022-03-15 DIAGNOSIS — H25.12 NUCLEAR SENILE CATARACT OF LEFT EYE: ICD-10-CM

## 2022-03-15 DIAGNOSIS — H04.123 DRY EYE SYNDROME, BILATERAL: ICD-10-CM

## 2022-03-15 DIAGNOSIS — E11.9 TYPE 2 DIABETES MELLITUS WITHOUT COMPLICATION, WITHOUT LONG-TERM CURRENT USE OF INSULIN: ICD-10-CM

## 2022-03-15 PROCEDURE — 1159F PR MEDICATION LIST DOCUMENTED IN MEDICAL RECORD: ICD-10-PCS | Mod: CPTII,S$GLB,, | Performed by: OPHTHALMOLOGY

## 2022-03-15 PROCEDURE — 1160F PR REVIEW ALL MEDS BY PRESCRIBER/CLIN PHARMACIST DOCUMENTED: ICD-10-PCS | Mod: CPTII,S$GLB,, | Performed by: OPHTHALMOLOGY

## 2022-03-15 PROCEDURE — 99214 OFFICE O/P EST MOD 30 MIN: CPT | Mod: S$GLB,,, | Performed by: OPHTHALMOLOGY

## 2022-03-15 PROCEDURE — 1159F MED LIST DOCD IN RCRD: CPT | Mod: CPTII,S$GLB,, | Performed by: OPHTHALMOLOGY

## 2022-03-15 PROCEDURE — 4010F PR ACE/ARB THEARPY RXD/TAKEN: ICD-10-PCS | Mod: CPTII,S$GLB,, | Performed by: OPHTHALMOLOGY

## 2022-03-15 PROCEDURE — 99999 PR PBB SHADOW E&M-EST. PATIENT-LVL II: ICD-10-PCS | Mod: PBBFAC,,, | Performed by: OPHTHALMOLOGY

## 2022-03-15 PROCEDURE — 1160F RVW MEDS BY RX/DR IN RCRD: CPT | Mod: CPTII,S$GLB,, | Performed by: OPHTHALMOLOGY

## 2022-03-15 PROCEDURE — 4010F ACE/ARB THERAPY RXD/TAKEN: CPT | Mod: CPTII,S$GLB,, | Performed by: OPHTHALMOLOGY

## 2022-03-15 PROCEDURE — 99214 PR OFFICE/OUTPT VISIT, EST, LEVL IV, 30-39 MIN: ICD-10-PCS | Mod: S$GLB,,, | Performed by: OPHTHALMOLOGY

## 2022-03-15 PROCEDURE — 99999 PR PBB SHADOW E&M-EST. PATIENT-LVL II: CPT | Mod: PBBFAC,,, | Performed by: OPHTHALMOLOGY

## 2022-03-15 RX ORDER — GLIPIZIDE 5 MG/1
1 TABLET ORAL DAILY
COMMUNITY
Start: 2022-02-23

## 2022-03-15 RX ORDER — ACETAMINOPHEN AND CODEINE PHOSPHATE 300; 30 MG/1; MG/1
.5-1 TABLET ORAL 2 TIMES DAILY PRN
COMMUNITY
Start: 2021-11-19

## 2022-03-15 NOTE — PROGRESS NOTES
HPI     Glaucoma     Comments: 1M Dilation              Comments     Patient states that he is using and tolerating Cosopt and Xalatan OU as   directed - denies pain/ discomfort OU - denies va changes OU      1. DM since 2000  2. Traumatic aphakia OD  3. Glaucoma OD noncompliant  tmax 37/22  Last VF 4/2011, sup altitudinal loss  4. Parkinsons      Alphagan, latanoprost and cosopt all burned with photosensitivity    Between March 2019 to October 2019 patient has had steroid injections in   his back and knee.  DX with Parkinson's in 2016    Timolol bid od  Xalatan qhs OU  O3FO bid  Systane ultra prn  Systane gel qpm OU            Last edited by Debby Cobb MA on 3/15/2022  3:23 PM. (History)            Assessment /Plan     For exam results, see Encounter Report.      ICD-10-CM ICD-9-CM    1. Traumatic glaucoma, right, moderate stage  H40.31X2 365.65 Doing well - intraocular pressure is within acceptable range relative to target pressure with no evidence of progression.   Continue current treatment.  Reviewed importance of continued compliance with treatment and follow up.     Consider SLT OD if IOP elevates, block     365.72    2. 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 Type 2 diabetes mellitus without complication, without long-term current use of insulin  E11.9 250.00 Diabetes with no diabetic retinopathy on dilated exam.   Reviewed diabetic eye precautions including excellent blood sugar control, and importance of regular follow up.    4. Dry eye syndrome, bilateral  H04.123 375.15 Continue Xalatan, O3FO & Systane    5. Nuclear senile cataract of left eye  H25.12 366.16 You were found to have an early cataract in your eye(s) today, however the cataract is not affecting your activities of daily living, such as reading and driving.You do not need  surgery at this time. We will recheck your cataract at your next visit. You are welcome to call for an earlier  appointment if your vision gets worse.        Return to clinic 2 months for IOP check           Cosopt BID OD  Xalatan qhs OU  O3FO bid  Systane ultra prn  Systane gel qpm OU

## 2022-08-15 DIAGNOSIS — H40.31X0 TRAUMATIC GLAUCOMA, RIGHT, STAGE UNSPECIFIED: ICD-10-CM

## 2022-08-16 RX ORDER — LATANOPROST 50 UG/ML
1 SOLUTION/ DROPS OPHTHALMIC NIGHTLY
Qty: 2.5 ML | Refills: 3 | Status: SHIPPED | OUTPATIENT
Start: 2022-08-16 | End: 2023-02-22 | Stop reason: SDUPTHER

## 2023-01-25 DIAGNOSIS — H40.31X0 TRAUMATIC GLAUCOMA, RIGHT, STAGE UNSPECIFIED: ICD-10-CM

## 2023-01-25 RX ORDER — LATANOPROST 50 UG/ML
1 SOLUTION/ DROPS OPHTHALMIC NIGHTLY
Qty: 2.5 ML | Refills: 3 | OUTPATIENT
Start: 2023-01-25

## 2023-02-22 DIAGNOSIS — H40.31X0 TRAUMATIC GLAUCOMA, RIGHT, STAGE UNSPECIFIED: ICD-10-CM

## 2023-02-22 RX ORDER — LATANOPROST 50 UG/ML
1 SOLUTION/ DROPS OPHTHALMIC NIGHTLY
Qty: 2.5 ML | Refills: 6 | Status: SHIPPED | OUTPATIENT
Start: 2023-02-22

## 2023-06-23 ENCOUNTER — PATIENT MESSAGE (OUTPATIENT)
Dept: OPHTHALMOLOGY | Facility: CLINIC | Age: 78
End: 2023-06-23
Payer: MEDICARE

## 2024-01-31 ENCOUNTER — OFFICE VISIT (OUTPATIENT)
Dept: OPHTHALMOLOGY | Facility: CLINIC | Age: 79
End: 2024-01-31
Payer: MEDICARE

## 2024-01-31 DIAGNOSIS — H04.123 DRY EYE SYNDROME, BILATERAL: ICD-10-CM

## 2024-01-31 DIAGNOSIS — H40.31X2 TRAUMATIC GLAUCOMA, RIGHT, MODERATE STAGE: Primary | ICD-10-CM

## 2024-01-31 DIAGNOSIS — E11.9 TYPE 2 DIABETES MELLITUS WITHOUT COMPLICATION, WITHOUT LONG-TERM CURRENT USE OF INSULIN: ICD-10-CM

## 2024-01-31 DIAGNOSIS — H18.523 CORNEAL EPITHELIAL BASEMENT MEMBRANE DYSTROPHY OF BOTH EYES: ICD-10-CM

## 2024-01-31 PROCEDURE — 99214 OFFICE O/P EST MOD 30 MIN: CPT | Mod: S$GLB,,, | Performed by: OPHTHALMOLOGY

## 2024-01-31 PROCEDURE — 2023F DILAT RTA XM W/O RTNOPTHY: CPT | Mod: CPTII,S$GLB,, | Performed by: OPHTHALMOLOGY

## 2024-01-31 PROCEDURE — 1160F RVW MEDS BY RX/DR IN RCRD: CPT | Mod: CPTII,S$GLB,, | Performed by: OPHTHALMOLOGY

## 2024-01-31 PROCEDURE — 92133 CPTRZD OPH DX IMG PST SGM ON: CPT | Mod: S$GLB,,, | Performed by: OPHTHALMOLOGY

## 2024-01-31 PROCEDURE — 99999 PR PBB SHADOW E&M-EST. PATIENT-LVL IV: CPT | Mod: PBBFAC,,, | Performed by: OPHTHALMOLOGY

## 2024-01-31 PROCEDURE — 1159F MED LIST DOCD IN RCRD: CPT | Mod: CPTII,S$GLB,, | Performed by: OPHTHALMOLOGY

## 2024-01-31 RX ORDER — LATANOPROST 50 UG/ML
1 SOLUTION/ DROPS OPHTHALMIC DAILY
Qty: 2.5 ML | Refills: 6 | Status: SHIPPED | OUTPATIENT
Start: 2024-01-31 | End: 2025-01-30

## 2024-01-31 RX ORDER — TIMOLOL MALEATE 5 MG/ML
1 SOLUTION/ DROPS OPHTHALMIC 2 TIMES DAILY
Qty: 5 ML | Refills: 6 | Status: SHIPPED | OUTPATIENT
Start: 2024-01-31

## 2024-01-31 NOTE — PROGRESS NOTES
HPI     Glaucoma            Comments: Lost in follow up Yearly. VA is weaker. No pain or irritation.   Compliant with gtts.          Comments    1. DM since 2000  2. Traumatic aphakia OD  3. Glaucoma OD noncompliant  tmax 37/22  Last VF 4/2011, sup altitudinal loss  4. Parkinsons (x2016)      GCL: 29 OS (2/15/22)      Alphagan, latanoprost and cosopt all burned with photosensitivity      Timolol BID OD  Xalatan qhs OU  O3FO bid  Systane ultra prn  Systane gel qpm OU             Last edited by Kike Neff on 1/31/2024  1:50 PM.            Assessment /Plan     For exam results, see Encounter Report.      ICD-10-CM ICD-9-CM    1. Traumatic glaucoma, right, moderate stage  H40.31X2 365.65 IOP above target today, though pt has been off meds for a while.   Resume Timolol BID OD, and Xalatan qd OU     365.72       2. 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 Type 2 diabetes mellitus without complication, without long-term current use of insulin  E11.9 250.00 Diabetes with no diabetic retinopathy on dilated exam.   Reviewed diabetic eye precautions including excellent blood sugar control, and importance of regular follow up.         4. Dry eye syndrome, bilateral  H04.123 375.15 Continue current treatment plan          Return to clinic 6-8 weeks for IOP      Timolol BID OD  Xalatan qhs OU  O3FO bid  Systane ultra prn  Systane gel qpm OU

## 2025-03-28 DIAGNOSIS — H40.31X2 TRAUMATIC GLAUCOMA, RIGHT, MODERATE STAGE: ICD-10-CM

## 2025-03-28 DIAGNOSIS — H40.31X0 TRAUMATIC GLAUCOMA, RIGHT, STAGE UNSPECIFIED: ICD-10-CM

## 2025-03-28 RX ORDER — LATANOPROST 50 UG/ML
1 SOLUTION/ DROPS OPHTHALMIC NIGHTLY
Qty: 2.5 ML | Refills: 6 | Status: SHIPPED | OUTPATIENT
Start: 2025-03-28

## 2025-03-28 RX ORDER — TIMOLOL MALEATE 5 MG/ML
1 SOLUTION/ DROPS OPHTHALMIC 2 TIMES DAILY
Qty: 5 ML | Refills: 6 | Status: SHIPPED | OUTPATIENT
Start: 2025-03-28